# Patient Record
(demographics unavailable — no encounter records)

---

## 2024-10-11 NOTE — HISTORY OF PRESENT ILLNESS
[de-identified] : per mom pt has had on and off fever, and threw up x1 times yesterday, per mom lips and fingers were discolored yesterday  [FreeTextEntry6] : Fever to 102 x last night Cough and nasal congestion on and off x few days Denies SOB appetite decreased, + fluids Normal UOP Vomiting x 1 last night, no vomiting since, No diarrhea Hands and lips looked blue last night and this morning - last night was with fever and this morning no fever No known covid contacts, +

## 2024-10-11 NOTE — DISCUSSION/SUMMARY
[FreeTextEntry1] : rapid covid and flu negative Reassured normal exam and pulse ox, no cyanosis Symptomatic treatment Maintain adequate hydration  Cool mist humidifier Saline nose drops and bulb suctioning as needed Stressed handwashing and infection control  Pay close observation for new or worsening symptoms Instructed to return to office if symptoms worsen/persist or fevers persist Go to ER or UC if condition worsens or unable to to get to the office or after office hours

## 2024-10-15 NOTE — HISTORY OF PRESENT ILLNESS
[de-identified] : Fever x 4-5 days. Mom states when fever spikes feet, hands, and lips turn blue. Tylenol given at 6:30 this morning. [FreeTextEntry6] : Fevers persistent since thursday/friday last week TMAX 103.7F, mom sees that hands and feet get slightly discolored when fevering, happened last night in bath cough with thick congestion noted, no ear tugging, has b/l tubes no respiratory distress, no wheezing or dyspnea. No perioral cyanosis  No rashes, no lethargy No abdominal pain, no vomiting or diarrhea, stooling normally. Voiding normally, Drinking well. Poor appetite   In day care No recent travel. No other concerns at this time

## 2024-10-15 NOTE — PLAN
[TextEntry] : Return in 2 days for lung check, if still high fever after 48 hours on abx, will consider chest Xray and change to Augmentin***  Monitor for respiratory distress such as retractions, wheezing, color changes, rapid and irregular breathing patterns, stridor, or change of mental status -- if any of these symptoms/signs occur go directly to ER.   If child has fever/discomfort you can give Ibuprofen (>6months old) or Acetaminophen as per weight. Continue to encourage fluids to prevent dehydration. Encourage rest and decrease physical exertions. Can utilize nasal suctioning if needed.  Return to office with new or worsening symptoms.

## 2024-10-15 NOTE — REVIEW OF SYSTEMS
[Fever] : fever [Irritable] : no irritability [Inconsolable] : consolable [Malaise] : malaise [Difficulty with Sleep] : no difficulty with sleep [Nasal Discharge] : nasal discharge [Nasal Congestion] : nasal congestion [Tachypnea] : not tachypneic [Wheezing] : no wheezing [Cough] : cough [Congestion] : congestion [Negative] : Genitourinary

## 2024-10-15 NOTE — PHYSICAL EXAM
[Tired appearing] : not tired appearing [Lethargic] : not lethargic [Irritable] : not irritable [Consolable] : consolable [Playful] : playful [Toxic] : not toxic [Stridor] : no stridor [Erythema] : no erythema [Myringotomy tube present] : myringotomy tube present [Tachypnea] : no tachypnea [Belly Breathing] : no belly breathing [Subcostal Retractions] : no subcostal retractions [Suprasternal Retractions] : no suprasternal retractions [NL] : warm, clear [FreeTextEntry7] : right lower lobe with coarse crackles and rhonchi, no wheezing noted, intermittent transmitted upper airway sounds, no cyanosis, no perioral cyanosis, no diaphoresis

## 2024-10-18 NOTE — HISTORY OF PRESENT ILLNESS
[de-identified] : pt here for follow up for pnu, per grandma feeling better and no fevers since Tuesday, afebrile  [FreeTextEntry6] : On amoxicillin for 3 days since last visit No fever since starting antibiotics Cough and nasal congestion slightly better  Denies SOB Normal appetite Normal UOP No vomiting, No diarrhea

## 2024-10-18 NOTE — DISCUSSION/SUMMARY
[FreeTextEntry1] : Reassured lungs clear now Meds:  complete antibiotics as prescribed Supportive care - saline, humidifier, rest Increase fluids Hand washing and infection control discussed Advised to go to ER if symptoms acutely worsen Recheck at completion of antibiotics, sooner if symptoms worsen or fevers recur

## 2024-10-25 NOTE — HISTORY OF PRESENT ILLNESS
[de-identified] : F/U PNA  [FreeTextEntry6] : FINISHED ABX AND DOING WELL , NO COUGH NO FEVERS   Finished last dose of amox this morning No fever since starting antibiotics Had slight Cough initially now resolved No nasal congestion Denies SOB Normal appetite Normal UOP No vomiting, No diarrhea

## 2024-11-06 NOTE — DEVELOPMENTAL MILESTONES
[Normal Development] : Normal Development [None] : none [Passed] : passed [Yes] : Completed. [FreeTextEntry1] : CECY reviewed, no issues

## 2024-11-06 NOTE — HISTORY OF PRESENT ILLNESS
[Mother] : mother [Normal] : Normal [Playtime] : Playtime  [No] : No cigarette smoke exposure [Water heater temperature set at <120 degrees F] : Water heater temperature set at <120 degrees F [Car seat in back seat] : Car seat in back seat [Carbon Monoxide Detectors] : Carbon monoxide detectors [Smoke Detectors] : Smoke detectors [Vitamin] : Primary Fluoride Source: Vitamin [Temper Tantrums] : Temper Tantrums [FreeTextEntry7] : 18 month well child [LastFluorideTreatment] : NA

## 2024-11-06 NOTE — PHYSICAL EXAM
[Alert] : alert [No Acute Distress] : no acute distress [Normocephalic] : normocephalic [Anterior [place] Closed] : anterior fontanelle closed [Red Reflex Bilateral] : red reflex bilateral [PERRL] : PERRL [Normally Placed Ears] : normally placed ears [Auricles Well Formed] : auricles well formed [Clear Tympanic membranes with present light reflex and bony landmarks] : clear tympanic membranes with present light reflex and bony landmarks [No Discharge] : no discharge [Nares Patent] : nares patent [Palate Intact] : palate intact [Uvula Midline] : uvula midline [Tooth Eruption] : tooth eruption  [Supple, full passive range of motion] : supple, full passive range of motion [No Palpable Masses] : no palpable masses [Symmetric Chest Rise] : symmetric chest rise [Clear to Auscultation Bilaterally] : clear to auscultation bilaterally [Regular Rate and Rhythm] : regular rate and rhythm [S1, S2 present] : S1, S2 present [No Murmurs] : no murmurs [+2 Femoral Pulses] : +2 femoral pulses [Soft] : soft [NonTender] : non tender [Non Distended] : non distended [Normoactive Bowel Sounds] : normoactive bowel sounds [No Hepatomegaly] : no hepatomegaly [No Splenomegaly] : no splenomegaly [Jose 1] : Jose 1 [No Clitoromegaly] : no clitoromegaly [Normal Vaginal Introitus] : normal vaginal introitus [Patent] : patent [Normally Placed] : normally placed [No Abnormal Lymph Nodes Palpated] : no abnormal lymph nodes palpated [No Clavicular Crepitus] : no clavicular crepitus [Symmetric Buttocks Creases] : symmetric buttocks creases [No Spinal Dimple] : no spinal dimple [NoTuft of Hair] : no tuft of hair [Cranial Nerves Grossly Intact] : cranial nerves grossly intact [No Rash or Lesions] : no rash or lesions [FreeTextEntry3] : tubes bilateral

## 2024-11-06 NOTE — DISCUSSION/SUMMARY
[Normal Growth] : growth [Normal Development] : development [None] : No known medical problems [No Elimination Concerns] : elimination [No Feeding Concerns] : feeding [No Skin Concerns] : skin [Normal Sleep Pattern] : sleep [Family Support] : family support [Child Development and Behavior] : child development and behavior [Language Promotion/Hearing] : language promotion/hearing [Toliet Training Readiness] : toliet training readiness [Safety] : safety [No Medications] : ~He/She~ is not on any medications [Parent/Guardian] : parent/guardian [] : The components of the vaccine(s) to be administered today are listed in the plan of care. The disease(s) for which the vaccine(s) are intended to prevent and the risks have been discussed with the caretaker.  The risks are also included in the appropriate vaccination information statements which have been provided to the patient's caregiver.  The caregiver has given consent to vaccinate. [FreeTextEntry1] : FAMILY SUPPORT: Discussed parental well-being, adjustment to toddler's growing independence and occasional negativity, queries about a new sibling planned or on the way.  DEVELOPMENT/BEHAVIOR: Discussed child development and behavior, adaptation to non-parental care and anticipation of return to clinging, other changes connected with new cognitive gains.   LANGUAGE PROMOTION/HEARING: Discussed encouragement of language, use of simple words and phrases, engagement in reading/singing/talking.  TOILET TRAINING: Discussed toilet training readiness (recognizing signs of readiness, parental expectations).  SAFETY: Discussed car safety seats, parental use of safety belts, falls, fires, and burns; poisoning; guns. Smoke and carbon monoxide monitors stressed.  Lead exposure discussed.

## 2024-11-27 NOTE — DATA REVIEWED
[FreeTextEntry1] : Audiogram 8/20/24, personally reviewed: mild loss at 500 Hz for soundfields better hearing ear, within normal limits 1000 Hz, 2000 Hz. Large volume tympanograms

## 2024-11-27 NOTE — CONSULT LETTER
[Dear  ___] : Dear  [unfilled], [Consult Letter:] : I had the pleasure of evaluating your patient, [unfilled]. [Consult Closing:] : Thank you very much for allowing me to participate in the care of this patient.  If you have any questions, please do not hesitate to contact me. [Sincerely,] : Sincerely, [FreeTextEntry3] : Lidia Hercules M.D. [place] [address] Tel [phone] Fax [phone]

## 2024-11-27 NOTE — HISTORY OF PRESENT ILLNESS
[de-identified] : Riya is an 18 month old girl here for ear check.  Here with mom who provides history.  Previously seen by Dr. Barraza for ear tube placement 4/15/24.   Here for ear tube check as this office is easier to get to than Dr. Barraza's office.  No ear infections or ear drainage since tubes placed.  No hearing or speech concerns.   Some snoring only when sick without pauses or gasping.  No recurrent throat infections.  No other otolaryngology concerns.

## 2024-11-27 NOTE — ASSESSMENT
[FreeTextEntry1] : Ear tubes in place and patent. We discussed plan for audiogram at next visit.  Follow up 6-9 months for repeat ear check.

## 2024-11-27 NOTE — PHYSICAL EXAM
[Exposed Vessel] : left anterior vessel not exposed [Increased Work of Breathing] : no increased work of breathing with use of accessory muscles and retractions [Normal Gait and Station] : normal gait and station [Normal muscle strength, symmetry and tone of facial, head and neck musculature] : normal muscle strength, symmetry and tone of facial, head and neck musculature [Normal] : no cervical lymphadenopathy [de-identified] : tympanostomy tube in place and patent [de-identified] : tympanostomy tube in place and patent

## 2024-12-04 NOTE — HISTORY OF PRESENT ILLNESS
[de-identified] : rash on feet and now spreading all over body, per mom fever on Sat and Sun and no more fevers. [FreeTextEntry6] :   Cough, congestion, no fever today but had fever over weekend  started with rash on feet and hands two days ago, noticed few more pustules on lips today  coxsackie virus outbreak at her  No sore throat, no cough, no respiratory distress, no wheezing or dyspnea. No rashes, no lethargy, no myalgia. No abdominal pain, no vomiting or diarrhea, stooling normally. Voiding normally, eating and drinking well. No concern for dehydration.   No sick contacts. No recent travel. No other concerns at this time

## 2024-12-04 NOTE — PHYSICAL EXAM
[Tired appearing] : not tired appearing [Lethargic] : not lethargic [Consolable] : consolable [Playful] : not playful [Toxic] : not toxic [Stridor] : no stridor [Erythema] : no erythema [Myringotomy tube present] : myringotomy tube present [Wheezing] : no wheezing [Rales] : no rales [Crackles] : no crackles [Transmitted Upper Airway Sounds] : transmitted upper airway sounds [Rhonchi] : no rhonchi [NL] : moves all extremities x4, warm, well perfused x4 [de-identified] : small clustered pustules on bilateral palms and soles, small erythematous pustule on lower lip

## 2024-12-04 NOTE — PLAN
[TextEntry] : Discussed Coxsackie viral illness, symptomatically manage with tylenol/motrin, keep lesions dry and clean.   Symptomatic treatment. Maintain adequate hydration & rest. Warm Mist humidifier in room.  Nasal suctioning PRN if applicable Any fever >5 days, return to office. Stressed hand washing and infection control Pay close observation for new or worsening symptoms.   Instructed to return to office if condition worsens or new symptoms arise.

## 2024-12-04 NOTE — REVIEW OF SYSTEMS
[Fever] : fever [Irritable] : irritability [Malaise] : no malaise [Ear Tugging] : no ear tugging [Nasal Discharge] : nasal discharge [Nasal Congestion] : nasal congestion [Tachypnea] : not tachypneic [Wheezing] : no wheezing [Cough] : cough [Rash] : rash [Negative] : Genitourinary

## 2025-03-17 NOTE — HISTORY OF PRESENT ILLNESS
[de-identified] : Fever x 3 days. Also congestion and slight cough x 1 day. [FreeTextEntry6] : Fever x 3 days 101-102 Cough and nasal congestion x today Denies  SOB appetite decreased, + fluids Normal UOP No vomiting, No diarrhea No known covid contacts

## 2025-03-17 NOTE — DISCUSSION/SUMMARY
[FreeTextEntry1] : rapid covid and flu negative Symptomatic treatment Maintain adequate hydration  Cool mist humidifier Saline nose drops and bulb suctioning as needed Stressed handwashing and infection control  Pay close observation for new or worsening symptoms Instructed to return to office if symptoms worsen/persist or fevers persist Go to ER or UC if condition worsens or unable to to get to the office or after office hours

## 2025-05-08 NOTE — DEVELOPMENTAL MILESTONES
[Normal Development] : Normal Development [None] : none [Passed] : passed [FreeTextEntry1] : GM - 2-4 FMA - 2-7 PS - 3-1 L - 2-10

## 2025-05-08 NOTE — HISTORY OF PRESENT ILLNESS
[Normal] : Normal [Vitamin] : Primary Fluoride Source: Vitamin [No] : No cigarette smoke exposure [Water heater temperature set at <120 degrees F] : Water heater temperature set at <120 degrees F [Car seat in back seat] : Car seat in back seat [Smoke Detectors] : Smoke detectors [Carbon Monoxide Detectors] : Carbon monoxide detectors [Up to date] : Up to date [At risk for exposure to TB] : Not at risk for exposure to Tuberculosis [de-identified] : good variety of foods [LastFluorideTreatment] : n/a [FreeTextEntry1] : sees ENT every few months - difficult getting hearing test still, will f/u this summer

## 2025-05-08 NOTE — PHYSICAL EXAM

## 2025-06-11 NOTE — CONSULT LETTER
[Dear  ___] : Dear  [unfilled], [Consult Letter:] : I had the pleasure of evaluating your patient, [unfilled]. [Please see my note below.] : Please see my note below. [Sincerely,] : Sincerely, [Consult Closing:] : Thank you very much for allowing me to participate in the care of this patient.  If you have any questions, please do not hesitate to contact me. [FreeTextEntry3] : Lidia Hercules M.D. Pediatric [place] [address] Tel [phone] Fax [phone]

## 2025-06-11 NOTE — ASSESSMENT
[FreeTextEntry1] : Right ear tube partially extruded, left ear tube in place and patent. No issues at this time.   We discussed the management of functioning ear tubes. No need to keep ears dry during bath or showers. No restrictions on swimming in chlorinated pools. Water precautions for lakes, ponds, or ocean. Episodes of otorrhea should be treated with Ofloxacin drops 3 drops to draining ear twice per day for 5 days, unless with other global symptoms. The patient may use the drops from the OR. I have instructed them to call my office for drop refills and so we have a record of the infection, if needed. If the drainage lasts longer than one week they should call our office for evaluation and debridement of ear.   Follow up in approximately 6-9 months.

## 2025-06-11 NOTE — HISTORY OF PRESENT ILLNESS
[de-identified] : Riya is a 2 year old here for tube check.  Here with mom who provides history.  Previously seen by Dr. Barraza for ear tube placement 4/15/24.   No otorrhea or ear infections since last visit.  No hearing or speech concerns.  No other otolaryngology concerns today.

## 2025-06-11 NOTE — DATA REVIEWED
[FreeTextEntry1] : Audiogram 8/20/24, personally reviewed: mild loss at 500 Hz for soundfields better hearing ear, within normal limits 1000 Hz, 2000 Hz. Large volume tympanograms.

## 2025-06-11 NOTE — PHYSICAL EXAM
[Normal Gait and Station] : normal gait and station [Normal muscle strength, symmetry and tone of facial, head and neck musculature] : normal muscle strength, symmetry and tone of facial, head and neck musculature [Normal] : no cervical lymphadenopathy [Exposed Vessel] : left anterior vessel not exposed [Increased Work of Breathing] : no increased work of breathing with use of accessory muscles and retractions [de-identified] : partially extruded ear tube  [de-identified] : ear tube in place and patent

## 2025-07-11 NOTE — DISCUSSION/SUMMARY
[FreeTextEntry1] : THERAPY Moisturizers hypoallergenic at least BID to TID Hydrocortisone 2.5 % BID to affected areas x 5-7 days as needed for flares Change to all hypoallergenic products Handwashing and infection control discussed Watch for signs/symptoms of infection Next Visit: as needed with an office visit if rash worsening/changing or signs of infection develop

## 2025-07-11 NOTE — HISTORY OF PRESENT ILLNESS
[de-identified] : rash behind knees x 1 week, afebrile.  [FreeTextEntry6] : Rash behind knees and arm creases x 1 week Rash is not itchy or painful No recent illness/fever No new exposures/foods/medicines. No household contacts with rash. was applying eucerin and aveeno eczema lotion and seemed to get worse, it has helped before for similar rashes.  then started powder and seems better

## 2025-07-11 NOTE — PHYSICAL EXAM
[NL] : warm, clear [de-identified] : erythematous dry patches to b/l antecub fossa, b/l popliteal fossa, few round patches on legs